# Patient Record
Sex: FEMALE | URBAN - METROPOLITAN AREA
[De-identification: names, ages, dates, MRNs, and addresses within clinical notes are randomized per-mention and may not be internally consistent; named-entity substitution may affect disease eponyms.]

---

## 2021-01-11 ENCOUNTER — OFFICE VISIT (OUTPATIENT)
Dept: URBAN - METROPOLITAN AREA TELEHEALTH 2 | Facility: TELEHEALTH | Age: 56
End: 2021-01-11
Payer: COMMERCIAL

## 2021-01-11 DIAGNOSIS — K21.9 GERD: ICD-10-CM

## 2021-01-11 DIAGNOSIS — D64.9 ANEMIA: ICD-10-CM

## 2021-01-11 DIAGNOSIS — Z83.71 FAMILY HISTORY OF COLONIC POLYPS: ICD-10-CM

## 2021-01-11 DIAGNOSIS — Z12.11 COLON CANCER SCREENING: ICD-10-CM

## 2021-01-11 PROCEDURE — 99203 OFFICE O/P NEW LOW 30 MIN: CPT | Performed by: INTERNAL MEDICINE

## 2021-01-11 PROCEDURE — 99243 OFF/OP CNSLTJ NEW/EST LOW 30: CPT | Performed by: INTERNAL MEDICINE

## 2021-01-11 RX ORDER — SODIUM, POTASSIUM,MAG SULFATES 17.5-3.13G
354 ML SOLUTION, RECONSTITUTED, ORAL ORAL
Qty: 354 ML | Refills: 0 | OUTPATIENT
Start: 2021-01-11 | End: 2021-01-12

## 2021-01-11 NOTE — HPI-TODAY'S VISIT:
The patient was referred by Dr. Ren for anemia / colon cancer screening.   A copy of this document is being forwarded to the referring provider.  55 y.o. WF, , 1 child Just dx w/ diabetes and anemia Seeing blood in stool - not w/ every bm - perceives it as a hemorrhoid Bowels every 2-3 days; notices blood w/ hard stool, not soft stool No abd pain No N/V On new diet - lost 15 pounds - needs to do it for diabetes Prilosec controls heartburn

## 2021-03-08 ENCOUNTER — TELEPHONE ENCOUNTER (OUTPATIENT)
Dept: URBAN - METROPOLITAN AREA CLINIC 96 | Facility: CLINIC | Age: 56
End: 2021-03-08

## 2021-03-08 RX ORDER — SODIUM, POTASSIUM,MAG SULFATES 17.5-3.13G
177 ML SOLUTION, RECONSTITUTED, ORAL ORAL AS DIRECTED
Qty: 1 BOX | Refills: 0 | OUTPATIENT
Start: 2021-03-09 | End: 2021-03-10

## 2021-04-13 ENCOUNTER — OFFICE VISIT (OUTPATIENT)
Dept: URBAN - METROPOLITAN AREA SURGERY CENTER 18 | Facility: SURGERY CENTER | Age: 56
End: 2021-04-13
Payer: COMMERCIAL

## 2021-04-13 ENCOUNTER — WEB ENCOUNTER (OUTPATIENT)
Dept: URBAN - METROPOLITAN AREA CLINIC 92 | Facility: CLINIC | Age: 56
End: 2021-04-13

## 2021-04-13 DIAGNOSIS — Z12.11 COLON CANCER SCREENING: ICD-10-CM

## 2021-04-13 DIAGNOSIS — K21.00 ALKALINE REFLUX ESOPHAGITIS: ICD-10-CM

## 2021-04-13 DIAGNOSIS — D12.3 ADENOMA OF TRANSVERSE COLON: ICD-10-CM

## 2021-04-13 DIAGNOSIS — K31.7 BENIGN GASTRIC POLYP: ICD-10-CM

## 2021-04-13 DIAGNOSIS — K29.80 ACUTE DUODENITIS: ICD-10-CM

## 2021-04-13 DIAGNOSIS — K29.30 CHRONIC SUPERFICIAL GASTRITIS: ICD-10-CM

## 2021-04-13 DIAGNOSIS — D12.4 ADENOMA OF DESCENDING COLON: ICD-10-CM

## 2021-04-13 DIAGNOSIS — Z83.71 FAMILY HISTORY OF COLON POLYPS: ICD-10-CM

## 2021-04-13 PROBLEM — 87522002: Status: ACTIVE | Noted: 2021-04-13

## 2021-04-13 PROCEDURE — 45385 COLONOSCOPY W/LESION REMOVAL: CPT | Performed by: INTERNAL MEDICINE

## 2021-04-13 PROCEDURE — G8907 PT DOC NO EVENTS ON DISCHARG: HCPCS | Performed by: INTERNAL MEDICINE

## 2021-04-13 PROCEDURE — 43239 EGD BIOPSY SINGLE/MULTIPLE: CPT | Performed by: INTERNAL MEDICINE

## 2021-04-13 RX ORDER — PANTOPRAZOLE SODIUM 40 MG/1
TAPER TABLET, DELAYED RELEASE ORAL
Qty: 180 | Refills: 0 | OUTPATIENT

## 2021-04-14 LAB
A/G RATIO: 1.6
ALBUMIN: 4.2
ALKALINE PHOSPHATASE: 107
ALT (SGPT): 6
AST (SGOT): 11
BASO (ABSOLUTE): 0.1
BASOS: 1
BILIRUBIN, TOTAL: 0.4
BUN/CREATININE RATIO: 19
BUN: 16
CALCIUM: 9.5
CARBON DIOXIDE, TOTAL: 23
CHLORIDE: 104
CREATININE: 0.85
EGFR IF AFRICN AM: 90
EGFR IF NONAFRICN AM: 78
EOS (ABSOLUTE): 0.2
EOS: 2
FERRITIN, SERUM: 9
FOLATE (FOLIC ACID), SERUM: 10.2
GLOBULIN, TOTAL: 2.6
GLUCOSE: 93
HEMATOCRIT: 35.8
HEMATOLOGY COMMENTS:: (no result)
HEMOGLOBIN: 10.9
IMMATURE CELLS: (no result)
IMMATURE GRANS (ABS): 0
IMMATURE GRANULOCYTES: 0
IRON BIND.CAP.(TIBC): 346
IRON SATURATION: 9
IRON: 30
LYMPHS (ABSOLUTE): 2.5
LYMPHS: 31
MCH: 25.2
MCHC: 30.4
MCV: 83
MONOCYTES(ABSOLUTE): 0.5
MONOCYTES: 6
NEUTROPHILS (ABSOLUTE): 4.8
NEUTROPHILS: 60
NRBC: (no result)
PLATELETS: 417
POTASSIUM: 4.6
PROTEIN, TOTAL: 6.8
RBC: 4.33
RDW: 15.3
SODIUM: 139
UIBC: 316
VITAMIN B12: 321
WBC: 8.1

## 2021-04-16 ENCOUNTER — WEB ENCOUNTER (OUTPATIENT)
Dept: URBAN - METROPOLITAN AREA CLINIC 92 | Facility: CLINIC | Age: 56
End: 2021-04-16

## 2021-04-16 RX ORDER — VITAMIN A 2400 MCG
1 TABLET CAPSULE ORAL BID
Qty: 180 TABLET | Refills: 0 | OUTPATIENT

## 2021-06-08 ENCOUNTER — OFFICE VISIT (OUTPATIENT)
Dept: URBAN - METROPOLITAN AREA TELEHEALTH 2 | Facility: TELEHEALTH | Age: 56
End: 2021-06-08
Payer: COMMERCIAL

## 2021-06-08 ENCOUNTER — DASHBOARD ENCOUNTERS (OUTPATIENT)
Age: 56
End: 2021-06-08

## 2021-06-08 DIAGNOSIS — Z12.11 COLON CANCER SCREENING: ICD-10-CM

## 2021-06-08 DIAGNOSIS — K21.9 GERD: ICD-10-CM

## 2021-06-08 DIAGNOSIS — K92.1 RECTAL BLEEDING: ICD-10-CM

## 2021-06-08 DIAGNOSIS — D64.9 ANEMIA: ICD-10-CM

## 2021-06-08 PROBLEM — 235595009 GASTROESOPHAGEAL REFLUX DISEASE: Status: ACTIVE | Noted: 2021-01-11

## 2021-06-08 PROCEDURE — 99214 OFFICE O/P EST MOD 30 MIN: CPT | Performed by: INTERNAL MEDICINE

## 2021-06-08 RX ORDER — PANTOPRAZOLE SODIUM 40 MG/1
TAPER TABLET, DELAYED RELEASE ORAL
Qty: 180 | Refills: 0 | Status: ACTIVE | COMMUNITY

## 2021-06-08 RX ORDER — VITAMIN A 2400 MCG
1 TABLET CAPSULE ORAL BID
Qty: 180 TABLET | Refills: 0 | Status: ACTIVE | COMMUNITY

## 2021-06-08 NOTE — HPI-TODAY'S VISIT:
Patient had egd and colon 4/13/2021 EGD: LA grade A reflux esophagitis, small hiatal hernia, few gastric polyps, nodular duodenum  PATH: chronic duodenitis, reflux changes, fundic gland polyp COLON: showed 2 polyps - tubular adenomas Recommended repeat colon in 5 years PPI bid and ferrous sulfate No heartburn or indigestion No abd pain No nausea or emesis BM are wnl - no BRBPR or melena Has not had labs rechecked yet to see if anemia is better